# Patient Record
Sex: FEMALE | Race: WHITE | URBAN - METROPOLITAN AREA
[De-identification: names, ages, dates, MRNs, and addresses within clinical notes are randomized per-mention and may not be internally consistent; named-entity substitution may affect disease eponyms.]

---

## 2021-03-12 ENCOUNTER — OFFICE VISIT (OUTPATIENT)
Dept: ORTHOPEDIC SURGERY | Age: 46
End: 2021-03-12
Payer: COMMERCIAL

## 2021-03-12 VITALS — HEIGHT: 59 IN | BODY MASS INDEX: 39.31 KG/M2 | WEIGHT: 195 LBS

## 2021-03-12 DIAGNOSIS — M25.441: Primary | ICD-10-CM

## 2021-03-12 DIAGNOSIS — M25.442: Primary | ICD-10-CM

## 2021-03-12 PROCEDURE — 99213 OFFICE O/P EST LOW 20 MIN: CPT | Performed by: ORTHOPAEDIC SURGERY

## 2021-03-12 NOTE — PROGRESS NOTES
Name: Hilda Truong    : 1975     Service Dept: [address] [facility]    Patient's Pharmacies:    [facility], [facility]  [address] [address]  Phone: [phone] Fax: [phone]       Chief Complaint   Patient presents with    Hand Pain    Wrist Pain    Finger Pain        Visit Vitals  Ht 4' 11\" (1.499 m)   Wt 195 lb (88.5 kg)   BMI 39.39 kg/m²      Allergies   Allergen Reactions    Clindamycin Unknown (comments)    Codeine Unknown (comments)    Pcn [Penicillins] Unknown (comments)    Percocet [Oxycodone-Acetaminophen] Unknown (comments)    Vicodin [Hydrocodone-Acetaminophen] Unknown (comments)         There is no problem list on file for this patient. Family History   Problem Relation Age of Onset    No Known Problems Mother     No Known Problems Father       Social History     Socioeconomic History    Marital status:      Spouse name: Not on file    Number of children: Not on file    Years of education: Not on file    Highest education level: Not on file   Tobacco Use    Smoking status: Current Every Day Smoker     Packs/day: 1.00     Years: 10.00     Pack years: 10.00    Smokeless tobacco: Never Used   Substance and Sexual Activity    Alcohol use: Not Currently    Drug use: Never    Sexual activity: Not Currently      History reviewed. No pertinent surgical history. Past Medical History:   Diagnosis Date    Diabetes ([address]     GERD (gastroesophageal reflux disease)         I have reviewed and agree with PFSH and ROS and intake form in chart and the record furthermore I have reviewed prior medical record(s) regarding this patients care during this appointment.      Review of Systems:   Patient is a pleasant appearing individual, appropriately dressed, well hydrated, well nourished, who is alert, appropriately oriented for age, and in no acute distress with a normal gait and normal affect who does not appear to be in any significant pain. Physical Exam:  Right hand- Grossly neurovascularly intact with good cap refill, decreased range of motion, decreased strength, positive crepitation, minimal swelling, skin intact with no skin lesions, no erythema, no echymosis, no point tenderness. Left hand -  Grossly neurovascularly intact with good cap refill, decreased range of motion, decreased strength, positive crepitation, minimal swelling, skin intact with no skin lesions, no erythema, no echymosis, no point tenderness. Encounter Diagnoses     ICD-10-CM ICD-9-CM   1. Effusion of joint of both hands  M25.441 719.04    M25.442        HPI:  The patient is here with a chief complaint of bilateral wrist pain, throbbing burning pain, progressively getting worse. Nothing has helped. Pain is 7/10. ROS:  10-point review of systems is unremarkable. 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 Assessment/Plan:  1. Bilateral hand swelling, nonspecific in nature. Plan at this point, I am going to have her see Dr. Kirsten Long, hand specialist, regarding nonspecific swelling. She had rheumatologic workup which was negative. As part of continued conservative pain management options the patient was advised to utilize Tylenol or OTC NSAIDS as long as it is not medically contraindicated. Return to Office:         Scribed by Naukl Salazar LPN as dictated by [facility] [place] MELANI Casiano MD.  Documentation True and Accepted Michael Casiano MD

## 2021-03-12 NOTE — PATIENT INSTRUCTIONS
